# Patient Record
Sex: FEMALE | Race: WHITE | Employment: FULL TIME | URBAN - METROPOLITAN AREA
[De-identification: names, ages, dates, MRNs, and addresses within clinical notes are randomized per-mention and may not be internally consistent; named-entity substitution may affect disease eponyms.]

---

## 2020-06-29 ENCOUNTER — HOSPITAL ENCOUNTER (EMERGENCY)
Age: 35
Discharge: HOME | End: 2020-06-30
Attending: EMERGENCY MEDICINE
Payer: SELF-PAY

## 2020-06-29 ENCOUNTER — APPOINTMENT (OUTPATIENT)
Dept: CT IMAGING | Age: 35
End: 2020-06-29
Attending: EMERGENCY MEDICINE
Payer: SELF-PAY

## 2020-06-29 DIAGNOSIS — K59.00 CONSTIPATION, UNSPECIFIED CONSTIPATION TYPE: Primary | ICD-10-CM

## 2020-06-29 LAB
ALBUMIN SERPL-MCNC: 3.7 G/DL (ref 3.5–5)
ALBUMIN/GLOB SERPL: 0.8 {RATIO} (ref 1.2–3.5)
ALP SERPL-CCNC: 108 U/L (ref 50–136)
ALT SERPL-CCNC: 17 U/L (ref 12–65)
ANION GAP SERPL CALC-SCNC: 7 MMOL/L (ref 7–16)
AST SERPL-CCNC: 15 U/L (ref 15–37)
BASOPHILS # BLD: 0.1 K/UL (ref 0–0.2)
BASOPHILS NFR BLD: 1 % (ref 0–2)
BILIRUB SERPL-MCNC: 0.5 MG/DL (ref 0.2–1.1)
BUN SERPL-MCNC: 12 MG/DL (ref 6–23)
CALCIUM SERPL-MCNC: 8.8 MG/DL (ref 8.3–10.4)
CHLORIDE SERPL-SCNC: 103 MMOL/L (ref 98–107)
CO2 SERPL-SCNC: 24 MMOL/L (ref 21–32)
CREAT SERPL-MCNC: 1.17 MG/DL (ref 0.6–1)
DIFFERENTIAL METHOD BLD: ABNORMAL
EOSINOPHIL # BLD: 0.2 K/UL (ref 0–0.8)
EOSINOPHIL NFR BLD: 1 % (ref 0.5–7.8)
ERYTHROCYTE [DISTWIDTH] IN BLOOD BY AUTOMATED COUNT: 12.2 % (ref 11.9–14.6)
GLOBULIN SER CALC-MCNC: 4.7 G/DL (ref 2.3–3.5)
GLUCOSE SERPL-MCNC: 372 MG/DL (ref 65–100)
HCG UR QL: NEGATIVE
HCT VFR BLD AUTO: 48.1 % (ref 35.8–46.3)
HGB BLD-MCNC: 15.8 G/DL (ref 11.7–15.4)
IMM GRANULOCYTES # BLD AUTO: 0 K/UL (ref 0–0.5)
IMM GRANULOCYTES NFR BLD AUTO: 0 % (ref 0–5)
LIPASE SERPL-CCNC: 422 U/L (ref 73–393)
LYMPHOCYTES # BLD: 2.9 K/UL (ref 0.5–4.6)
LYMPHOCYTES NFR BLD: 23 % (ref 13–44)
MCH RBC QN AUTO: 30.7 PG (ref 26.1–32.9)
MCHC RBC AUTO-ENTMCNC: 32.8 G/DL (ref 31.4–35)
MCV RBC AUTO: 93.6 FL (ref 79.6–97.8)
MONOCYTES # BLD: 1 K/UL (ref 0.1–1.3)
MONOCYTES NFR BLD: 8 % (ref 4–12)
NEUTS SEG # BLD: 8.3 K/UL (ref 1.7–8.2)
NEUTS SEG NFR BLD: 67 % (ref 43–78)
NRBC # BLD: 0 K/UL (ref 0–0.2)
PLATELET # BLD AUTO: 337 K/UL (ref 150–450)
PMV BLD AUTO: 11.4 FL (ref 9.4–12.3)
POTASSIUM SERPL-SCNC: 3.6 MMOL/L (ref 3.5–5.1)
PROT SERPL-MCNC: 8.4 G/DL (ref 6.3–8.2)
RBC # BLD AUTO: 5.14 M/UL (ref 4.05–5.2)
SODIUM SERPL-SCNC: 134 MMOL/L (ref 136–145)
WBC # BLD AUTO: 12.4 K/UL (ref 4.3–11.1)

## 2020-06-29 PROCEDURE — 74011000258 HC RX REV CODE- 258: Performed by: EMERGENCY MEDICINE

## 2020-06-29 PROCEDURE — 81003 URINALYSIS AUTO W/O SCOPE: CPT

## 2020-06-29 PROCEDURE — 74011250637 HC RX REV CODE- 250/637: Performed by: EMERGENCY MEDICINE

## 2020-06-29 PROCEDURE — 80053 COMPREHEN METABOLIC PANEL: CPT

## 2020-06-29 PROCEDURE — 99285 EMERGENCY DEPT VISIT HI MDM: CPT

## 2020-06-29 PROCEDURE — 74011250636 HC RX REV CODE- 250/636: Performed by: EMERGENCY MEDICINE

## 2020-06-29 PROCEDURE — 83690 ASSAY OF LIPASE: CPT

## 2020-06-29 PROCEDURE — 74177 CT ABD & PELVIS W/CONTRAST: CPT

## 2020-06-29 PROCEDURE — 81025 URINE PREGNANCY TEST: CPT

## 2020-06-29 PROCEDURE — 85025 COMPLETE CBC W/AUTO DIFF WBC: CPT

## 2020-06-29 PROCEDURE — 74011636320 HC RX REV CODE- 636/320: Performed by: EMERGENCY MEDICINE

## 2020-06-29 RX ORDER — MAGNESIUM CITRATE
296 SOLUTION, ORAL ORAL
Status: COMPLETED | OUTPATIENT
Start: 2020-06-29 | End: 2020-06-30

## 2020-06-29 RX ORDER — SODIUM CHLORIDE 0.9 % (FLUSH) 0.9 %
10 SYRINGE (ML) INJECTION
Status: COMPLETED | OUTPATIENT
Start: 2020-06-29 | End: 2020-06-29

## 2020-06-29 RX ORDER — PROMETHAZINE HYDROCHLORIDE 25 MG/1
25 TABLET ORAL
Status: COMPLETED | OUTPATIENT
Start: 2020-06-29 | End: 2020-06-29

## 2020-06-29 RX ADMIN — PROMETHAZINE HYDROCHLORIDE 25 MG: 25 TABLET ORAL at 22:13

## 2020-06-29 RX ADMIN — Medication 10 ML: at 23:04

## 2020-06-29 RX ADMIN — SODIUM CHLORIDE 1000 ML: 9 INJECTION, SOLUTION INTRAVENOUS at 22:14

## 2020-06-29 RX ADMIN — IOPAMIDOL 100 ML: 755 INJECTION, SOLUTION INTRAVENOUS at 23:04

## 2020-06-29 RX ADMIN — SODIUM CHLORIDE 100 ML: 900 INJECTION, SOLUTION INTRAVENOUS at 23:04

## 2020-06-29 NOTE — ED TRIAGE NOTES
Patient ambulatory to triage without difficulty with mask in place. Patient states she has not been having \"good bowel movements\" for 3 months. States last BM was 1 month ago. States she has attempted laxatives and enemas at home without relief. Reports RUQ pain.

## 2020-06-30 VITALS
HEART RATE: 87 BPM | SYSTOLIC BLOOD PRESSURE: 186 MMHG | HEIGHT: 64 IN | OXYGEN SATURATION: 100 % | TEMPERATURE: 97.9 F | BODY MASS INDEX: 24.75 KG/M2 | RESPIRATION RATE: 17 BRPM | DIASTOLIC BLOOD PRESSURE: 106 MMHG | WEIGHT: 145 LBS

## 2020-06-30 PROCEDURE — 74011250637 HC RX REV CODE- 250/637: Performed by: EMERGENCY MEDICINE

## 2020-06-30 RX ADMIN — MAGNESIUM CITRATE 296 ML: 1.75 LIQUID ORAL at 00:20

## 2020-06-30 NOTE — DISCHARGE INSTRUCTIONS
Patient Education        Constipation: Care Instructions  Your Care Instructions     Constipation means that you have a hard time passing stools (bowel movements). People pass stools from 3 times a day to once every 3 days. What is normal for you may be different. Constipation may occur with pain in the rectum and cramping. The pain may get worse when you try to pass stools. Sometimes there are small amounts of bright red blood on toilet paper or the surface of stools. This is because of enlarged veins near the rectum (hemorrhoids). A few changes in your diet and lifestyle may help you avoid ongoing constipation. Your doctor may also prescribe medicine to help loosen your stool. Some medicines can cause constipation. These include pain medicines and antidepressants. Tell your doctor about all the medicines you take. Your doctor may want to make a medicine change to ease your symptoms. Follow-up care is a key part of your treatment and safety. Be sure to make and go to all appointments, and call your doctor if you are having problems. It's also a good idea to know your test results and keep a list of the medicines you take. How can you care for yourself at home? · Drink plenty of fluids, enough so that your urine is light yellow or clear like water. If you have kidney, heart, or liver disease and have to limit fluids, talk with your doctor before you increase the amount of fluids you drink. · Include high-fiber foods in your diet each day. These include fruits, vegetables, beans, and whole grains. · Get at least 30 minutes of exercise on most days of the week. Walking is a good choice. You also may want to do other activities, such as running, swimming, cycling, or playing tennis or team sports. · Take a fiber supplement, such as Citrucel or Metamucil, every day. Read and follow all instructions on the label. · Schedule time each day for a bowel movement. A daily routine may help.  Take your time having your bowel movement. · Support your feet with a small step stool when you sit on the toilet. This helps flex your hips and places your pelvis in a squatting position. · Your doctor may recommend an over-the-counter laxative to relieve your constipation. Examples are Milk of Magnesia and MiraLax. Read and follow all instructions on the label. Do not use laxatives on a long-term basis. When should you call for help? Call your doctor now or seek immediate medical care if:  · You have new or worse belly pain. · You have new or worse nausea or vomiting. · You have blood in your stools. Watch closely for changes in your health, and be sure to contact your doctor if:  · Your constipation is getting worse. · You do not get better as expected. Where can you learn more? Go to http://levon-citlalli.info/  Enter P343 in the search box to learn more about \"Constipation: Care Instructions. \"  Current as of: June 26, 2019               Content Version: 12.5  © 2006-8604 [employer]. Care instructions adapted under license by [employer] (which disclaims liability or warranty for this information). If you have questions about a medical condition or this instruction, always ask your healthcare professional. [address] any warranty or liability for your use of this information.

## 2020-06-30 NOTE — ED PROVIDER NOTES
Pt with DM and fibromyalgia. Has had trouble with constipation for past 3 months getting worse this month. For the past week has had diffuse abdominal pain and nausea and vomiting. Tried home enema with minimal relief. No dysuria hematuria. No vaginal bleeding or discharge. The history is provided by the patient. No  was used. Constipation    This is a new problem. Episode onset: 3 months. The stool is described as hard. Associated symptoms include abdominal pain, nausea, vomiting and constipation. Pertinent negatives include no dysuria, no abdominal distention, no chills, no fever, no back pain and no diarrhea. She has tried nothing for the symptoms. Past Medical History:   Diagnosis Date    Diabetes ([facility] [address]     IDDM    Neurological disorder     back pain       Past Surgical History:   Procedure Laterality Date    HX CHOLECYSTECTOMY      HX ORTHOPAEDIC      bilateral feet to remove tumors         No family history on file.     Social History     Socioeconomic History    Marital status: SINGLE     Spouse name: Not on file    Number of children: Not on file    Years of education: Not on file    Highest education level: Not on file   Occupational History    Not on file   Social Needs    Financial resource strain: Not on file    Food insecurity     Worry: Not on file     Inability: Not on file    Transportation needs     Medical: Not on file     Non-medical: Not on file   Tobacco Use    Smoking status: Current Every Day Smoker     Packs/day: 0.50    Smokeless tobacco: Never Used   Substance and Sexual Activity    Alcohol use: Yes     Comment: socially    Drug use: No    Sexual activity: Yes     Partners: Male     Birth control/protection: None   Lifestyle    Physical activity     Days per week: Not on file     Minutes per session: Not on file    Stress: Not on file   Relationships    Social connections     Talks on phone: Not on file     Gets together: Not on file Attends Yazdanism service: Not on file     Active member of club or organization: Not on file     Attends meetings of clubs or organizations: Not on file     Relationship status: Not on file    Intimate partner violence     Fear of current or ex partner: Not on file     Emotionally abused: Not on file     Physically abused: Not on file     Forced sexual activity: Not on file   Other Topics Concern    Not on file   Social History Narrative    Not on file         ALLERGIES: Tramadol    Review of Systems   Constitutional: Negative for chills and fever. HENT: Negative for rhinorrhea and sore throat. Eyes: Negative for pain and redness. Respiratory: Negative for chest tightness, shortness of breath and wheezing. Cardiovascular: Negative for chest pain and leg swelling. Gastrointestinal: Positive for abdominal pain, constipation, nausea and vomiting. Negative for abdominal distention and diarrhea. Genitourinary: Negative for dysuria and hematuria. Musculoskeletal: Negative for back pain, gait problem, neck pain and neck stiffness. Skin: Negative for color change and rash. Neurological: Negative for weakness, numbness and headaches. Vitals:    06/29/20 1849   BP: (!) 155/101   Pulse: (!) 107   Resp: 16   Temp: 97.9 °F (36.6 °C)   SpO2: 99%   Weight: 65.8 kg (145 lb)   Height: 5' 4\" (1.626 m)            Physical Exam  Constitutional:       Appearance: Normal appearance. She is well-developed. HENT:      Head: Normocephalic and atraumatic. Neck:      Musculoskeletal: Normal range of motion and neck supple. Cardiovascular:      Rate and Rhythm: Normal rate and regular rhythm. Pulmonary:      Effort: Pulmonary effort is normal.      Breath sounds: Normal breath sounds. Abdominal:      General: Bowel sounds are normal.      Palpations: Abdomen is soft. Tenderness: There is abdominal tenderness (mild diffuse). Genitourinary:     Comments: No fecal impaction. Anterior rectal mass. Musculoskeletal: Normal range of motion. General: No swelling. Skin:     General: Skin is warm and dry. Neurological:      Mental Status: She is alert and oriented to person, place, and time. MDM  Number of Diagnoses or Management Options  Diagnosis management comments: Patient with constipation. Some relief with enema and mag citrate. Will discharge. Amount and/or Complexity of Data Reviewed  Clinical lab tests: ordered and reviewed  Tests in the medicine section of CPT®: ordered and reviewed    Patient Progress  Patient progress: stable         Procedures        Results Include:    Recent Results (from the past 24 hour(s))   CBC WITH AUTOMATED DIFF    Collection Time: 06/29/20  7:00 PM   Result Value Ref Range    WBC 12.4 (H) 4.3 - 11.1 K/uL    RBC 5.14 4.05 - 5.2 M/uL    HGB 15.8 (H) 11.7 - 15.4 g/dL    HCT 48.1 (H) 35.8 - 46.3 %    MCV 93.6 79.6 - 97.8 FL    MCH 30.7 26.1 - 32.9 PG    MCHC 32.8 31.4 - 35.0 g/dL    RDW 12.2 11.9 - 14.6 %    PLATELET 782 950 - 076 K/uL    MPV 11.4 9.4 - 12.3 FL    ABSOLUTE NRBC 0.00 0.0 - 0.2 K/uL    DF AUTOMATED      NEUTROPHILS 67 43 - 78 %    LYMPHOCYTES 23 13 - 44 %    MONOCYTES 8 4.0 - 12.0 %    EOSINOPHILS 1 0.5 - 7.8 %    BASOPHILS 1 0.0 - 2.0 %    IMMATURE GRANULOCYTES 0 0.0 - 5.0 %    ABS. NEUTROPHILS 8.3 (H) 1.7 - 8.2 K/UL    ABS. LYMPHOCYTES 2.9 0.5 - 4.6 K/UL    ABS. MONOCYTES 1.0 0.1 - 1.3 K/UL    ABS. EOSINOPHILS 0.2 0.0 - 0.8 K/UL    ABS. BASOPHILS 0.1 0.0 - 0.2 K/UL    ABS. IMM.  GRANS. 0.0 0.0 - 0.5 K/UL   HCG URINE, QL. - POC    Collection Time: 06/29/20  9:25 PM   Result Value Ref Range    Pregnancy test,urine (POC) Negative NEG     METABOLIC PANEL, COMPREHENSIVE    Collection Time: 06/29/20  9:38 PM   Result Value Ref Range    Sodium 134 (L) 136 - 145 mmol/L    Potassium 3.6 3.5 - 5.1 mmol/L    Chloride 103 98 - 107 mmol/L    CO2 24 21 - 32 mmol/L    Anion gap 7 7 - 16 mmol/L    Glucose 372 (H) 65 - 100 mg/dL    BUN 12 6 - 23 MG/DL    Creatinine 1.17 (H) 0.6 - 1.0 MG/DL    GFR est AA >60 >60 ml/min/1.73m2    GFR est non-AA 56 (L) >60 ml/min/1.73m2    Calcium 8.8 8.3 - 10.4 MG/DL    Bilirubin, total 0.5 0.2 - 1.1 MG/DL    ALT (SGPT) 17 12 - 65 U/L    AST (SGOT) 15 15 - 37 U/L    Alk. phosphatase 108 50 - 136 U/L    Protein, total 8.4 (H) 6.3 - 8.2 g/dL    Albumin 3.7 3.5 - 5.0 g/dL    Globulin 4.7 (H) 2.3 - 3.5 g/dL    A-G Ratio 0.8 (L) 1.2 - 3.5     LIPASE    Collection Time: 06/29/20  9:38 PM   Result Value Ref Range    Lipase 422 (H) 73 - 393 U/L       CT ABD PELV W CONT (Final result)   Result time 06/29/20 23:45:27   Final result by Ngoc Puentes MD (06/29/20 23:45:27)                Impression:    IMPRESSION:    1. A 3 cm cystic lesion in the right adnexa, likely hemorrhagic cyst.    2. Moderate stool volume in the colon. Negative for colitis, diverticulitis,  appendicitis or bowel obstruction. No hydronephrosis. 3. Cholecystectomy. Narrative:    CT abdomen and pelvis with contrast     COMPARISON: None. INDICATION: Abdominal pain. TECHNIQUE: CT imaging was performed of the abdomen and pelvis following the  uncomplicated administration of intravenous contrast (Isovue 370, 100 mL).  Oral  contrast was administered. Radiation dose reduction techniques were used for  this study:  Our CT scanners use one or all of the following: Automated exposure  control, adjustment of the mA and/or kVp according to patient's size, iterative  reconstruction. FINDINGS:    Visualized lung bases are unremarkable. Abdomen findings:    Gallbladder is surgically absent. The liver, pancreas, spleen, adrenal glands,  and the kidneys are unremarkable. Abdominal aorta is normal in course and  caliber. The stomach is normal in contour. Small bowel loops are normal in caliber. There  is no small bowel obstruction. No evidence of lymphadenopathy. Pelvic findings: There is a 3 cm cystic lesion in the right adnexa.  Uterus is not well evaluated. Urinary bladder is normal in contour. The colon is normal in course and caliber  with moderate stool volume. Appendix is within normal limits. There is no free air or free fluid. Surrounding bones are intact.

## 2020-06-30 NOTE — ED NOTES
I have reviewed discharge instructions with the patient. The patient verbalized understanding. Patient left ED via Discharge Method: ambulatory to Home with self. Opportunity for questions and clarification provided. Patient given 0 scripts. To continue your aftercare when you leave the hospital, you may receive an automated call from our care team to check in on how you are doing. This is a free service and part of our promise to provide the best care and service to meet your aftercare needs.  If you have questions, or wish to unsubscribe from this service please call [phone]. Thank you for Choosing our [facility] Emergency Department.

## 2020-12-04 ENCOUNTER — HOSPITAL ENCOUNTER (EMERGENCY)
Age: 35
Discharge: HOME | End: 2020-12-05
Attending: EMERGENCY MEDICINE

## 2020-12-04 DIAGNOSIS — H54.62 VISION LOSS, LEFT EYE: Primary | ICD-10-CM

## 2020-12-04 DIAGNOSIS — H57.04 MYDRIASIS: ICD-10-CM

## 2020-12-04 LAB
ALBUMIN SERPL-MCNC: 3.7 G/DL (ref 3.5–5)
ALBUMIN/GLOB SERPL: 0.8 {RATIO} (ref 1.2–3.5)
ALP SERPL-CCNC: 100 U/L (ref 50–136)
ALT SERPL-CCNC: 12 U/L (ref 12–65)
ANION GAP SERPL CALC-SCNC: 8 MMOL/L (ref 7–16)
AST SERPL-CCNC: 14 U/L (ref 15–37)
BASOPHILS # BLD: 0.1 K/UL (ref 0–0.2)
BASOPHILS NFR BLD: 0 % (ref 0–2)
BILIRUB SERPL-MCNC: 0.2 MG/DL (ref 0.2–1.1)
BUN SERPL-MCNC: 15 MG/DL (ref 6–23)
CALCIUM SERPL-MCNC: 9.6 MG/DL (ref 8.3–10.4)
CHLORIDE SERPL-SCNC: 105 MMOL/L (ref 98–107)
CO2 SERPL-SCNC: 23 MMOL/L (ref 21–32)
CREAT SERPL-MCNC: 1.23 MG/DL (ref 0.6–1)
DIFFERENTIAL METHOD BLD: ABNORMAL
EOSINOPHIL # BLD: 0.2 K/UL (ref 0–0.8)
EOSINOPHIL NFR BLD: 2 % (ref 0.5–7.8)
ERYTHROCYTE [DISTWIDTH] IN BLOOD BY AUTOMATED COUNT: 12 % (ref 11.9–14.6)
GLOBULIN SER CALC-MCNC: 4.4 G/DL (ref 2.3–3.5)
GLUCOSE BLD STRIP.AUTO-MCNC: 275 MG/DL (ref 65–100)
GLUCOSE SERPL-MCNC: 269 MG/DL (ref 65–100)
HCT VFR BLD AUTO: 45.3 % (ref 35.8–46.3)
HGB BLD-MCNC: 15.2 G/DL (ref 11.7–15.4)
IMM GRANULOCYTES # BLD AUTO: 0.1 K/UL (ref 0–0.5)
IMM GRANULOCYTES NFR BLD AUTO: 1 % (ref 0–5)
LYMPHOCYTES # BLD: 3.7 K/UL (ref 0.5–4.6)
LYMPHOCYTES NFR BLD: 29 % (ref 13–44)
MCH RBC QN AUTO: 31.1 PG (ref 26.1–32.9)
MCHC RBC AUTO-ENTMCNC: 33.6 G/DL (ref 31.4–35)
MCV RBC AUTO: 92.6 FL (ref 79.6–97.8)
MONOCYTES # BLD: 0.6 K/UL (ref 0.1–1.3)
MONOCYTES NFR BLD: 5 % (ref 4–12)
NEUTS SEG # BLD: 8.2 K/UL (ref 1.7–8.2)
NEUTS SEG NFR BLD: 64 % (ref 43–78)
NRBC # BLD: 0 K/UL (ref 0–0.2)
PLATELET # BLD AUTO: 322 K/UL (ref 150–450)
PMV BLD AUTO: 10.9 FL (ref 9.4–12.3)
POTASSIUM SERPL-SCNC: 3.9 MMOL/L (ref 3.5–5.1)
PROT SERPL-MCNC: 8.1 G/DL (ref 6.3–8.2)
RBC # BLD AUTO: 4.89 M/UL (ref 4.05–5.2)
SODIUM SERPL-SCNC: 136 MMOL/L (ref 136–145)
WBC # BLD AUTO: 12.8 K/UL (ref 4.3–11.1)

## 2020-12-04 PROCEDURE — 82962 GLUCOSE BLOOD TEST: CPT

## 2020-12-04 PROCEDURE — 80053 COMPREHEN METABOLIC PANEL: CPT

## 2020-12-04 PROCEDURE — 99285 EMERGENCY DEPT VISIT HI MDM: CPT

## 2020-12-04 PROCEDURE — 85025 COMPLETE CBC W/AUTO DIFF WBC: CPT

## 2020-12-04 PROCEDURE — 74011000250 HC RX REV CODE- 250: Performed by: EMERGENCY MEDICINE

## 2020-12-04 PROCEDURE — 83036 HEMOGLOBIN GLYCOSYLATED A1C: CPT

## 2020-12-04 RX ORDER — TETRACAINE HYDROCHLORIDE 5 MG/ML
1 SOLUTION OPHTHALMIC
Status: COMPLETED | OUTPATIENT
Start: 2020-12-04 | End: 2020-12-04

## 2020-12-04 RX ADMIN — TETRACAINE HYDROCHLORIDE 1 DROP: 5 SOLUTION OPHTHALMIC at 23:39

## 2020-12-04 RX ADMIN — FLUORESCEIN SODIUM 1 STRIP: 1 STRIP OPHTHALMIC at 23:39

## 2020-12-05 ENCOUNTER — APPOINTMENT (OUTPATIENT)
Dept: CT IMAGING | Age: 35
End: 2020-12-05
Attending: EMERGENCY MEDICINE

## 2020-12-05 VITALS
WEIGHT: 145 LBS | OXYGEN SATURATION: 98 % | HEART RATE: 105 BPM | BODY MASS INDEX: 24.75 KG/M2 | TEMPERATURE: 98.3 F | SYSTOLIC BLOOD PRESSURE: 155 MMHG | HEIGHT: 64 IN | RESPIRATION RATE: 16 BRPM | DIASTOLIC BLOOD PRESSURE: 90 MMHG

## 2020-12-05 LAB
EST. AVERAGE GLUCOSE BLD GHB EST-MCNC: 255 MG/DL
HBA1C MFR BLD: 10.5 % (ref 4.8–6)

## 2020-12-05 PROCEDURE — 70450 CT HEAD/BRAIN W/O DYE: CPT

## 2020-12-05 NOTE — ED NOTES
I have reviewed discharge instructions with the patient. The patient verbalized understanding. Patient left ED via Discharge Method: ambulatory to Home with self. Opportunity for questions and clarification provided. Patient given 0 scripts. To continue your aftercare when you leave the hospital, you may receive an automated call from our care team to check in on how you are doing.  This is a free service and part of our promise to provide the best care and service to meet your aftercare needs. \" If you have questions, or wish to unsubscribe from this service please call [phone].  Thank you for Choosing our [facility] Emergency Department.

## 2020-12-05 NOTE — ED TRIAGE NOTES
Pt arrives POV c/o seeing floaters in L eye for the last three days with visual loss. L eye painful to touch. Pt c/o headache and nausea. L pupil irregular, fixed, 6mm. R pupil is round, brisk, and 3mm.  with EMS. Pt has not taken insulin.  at time of triage. Pt reports swelling in hands and feet. Pt reports swelling is worse in morning than at night.

## 2020-12-05 NOTE — ED PROVIDER NOTES
Patient is a 81-year-old female presenting to the emergency department today complaining of vision loss in the left upper outer field of the left eye for the last 3 days. The patient said that she woke up on Tuesday and walked outside she thought maybe it was just the bright sun but then her vision never returned. She did not initially noticed that her left pupil was dilated that morning but later that afternoon she noticed it was much bigger than the right. The patient says that her vision has stayed gone since Tuesday morning. She says she is concerned about coming to the emergency department. She denies coming into contact with any over-the-counter or prescription medications. She is a diabetic and her hemoglobin A1c last time it was checked was 13. She said that she takes her insulin occasionally. 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 She smokes about a pack cigarettes per day. Past Medical History:   Diagnosis Date    Diabetes ([address]     IDDM    Neurological disorder     back pain       Past Surgical History:   Procedure Laterality Date    HX CHOLECYSTECTOMY      HX ORTHOPAEDIC      bilateral feet to remove tumors         No family history on file.     Social History     Socioeconomic History    Marital status: SINGLE     Spouse name: Not on file    Number of children: Not on file    Years of education: Not on file    Highest education level: Not on file   Occupational History    Not on file   Social Needs    Financial resource strain: Not on file    Food insecurity     Worry: Not on file     Inability: Not on file    Transportation needs     Medical: Not on file     Non-medical: Not on file   Tobacco Use    Smoking status: Current Every Day Smoker     Packs/day: 0.50    Smokeless tobacco: Never Used   Substance and Sexual Activity    Alcohol use: Yes     Comment: socially    Drug use: No    Sexual activity: Yes     Partners: Male     Birth control/protection: None   Lifestyle    Physical activity     Days per week: Not on file     Minutes per session: Not on file    Stress: Not on file   Relationships    Social connections     Talks on phone: Not on file     Gets together: Not on file     Attends Holiness service: Not on file     Active member of club or organization: Not on file     Attends meetings of clubs or organizations: Not on file     Relationship status: Not on file    Intimate partner violence     Fear of current or ex partner: Not on file     Emotionally abused: Not on file     Physically abused: Not on file     Forced sexual activity: Not on file   Other Topics Concern    Not on file   Social History Narrative    Not on file         ALLERGIES: Tramadol    Review of Systems   Eyes: Positive for visual disturbance. Neurological: Positive for headaches. All other systems reviewed and are negative. Vitals:    12/04/20 2227   BP: (!) 189/111   Pulse: 100   Resp: 16   Temp: 98.3 °F (36.8 °C)   SpO2: 99%   Weight: 65.8 kg (145 lb)   Height: 5' 4\" (1.626 m)            Physical Exam     GENERAL:The patient has Body mass index is 24.89 kg/m². Well-hydrated. No acute distress. VITAL SIGNS: Heart rate, blood pressure, respiratory rate reviewed as recorded in  nurse's notes  EYES: Right eye is clear. Examination of the left eye reveals no bulbar conjunctival injection. There is no erythema or edema to the upper or lower eyelid. Extraocular muscles are intact without pain or limitation bilaterally. Visual acuity as noted in nursing notes. Funduscopic examination is positive for red reflex. After administration of fluorescein and tetracaine woods lamp examination was performed of the left eye, reveals no superficial corneal abrasions. There are no dendritic lesions. No globe perforation, negative Elizabeth sign. No obvious ocular trauma or proptosis. Anterior chamber is clear. No flare cells, hyphema or hypopyon.  No perilimbal flush, no signs of iritis. Left pupil is dilated and nonreactive. Positive red reflex present. Left eye pressure is 13 tested twice with the same average. Ultrasound of the left eye shows no retinal detachment appreciated. MOUTH: Uvula is midline. There is no tonsillar enlargement or exudates appreciated. The patient does have cobblestoning noted in the posterior pharynx. The floor the  mouth is soft and there is no lesions or lacerations normal cavity. NECK: Positive tenderness to palpation with enlargement of the submandibular lymph  nodes bilaterally. Trachea midline. LUNGS: No accessory muscle use  CARDIOVASCULAR: Regular rate and rhythm  EXTREMITIES: Pt moving all 4 extremities with out limitations. Normal muscle tone. NEUROLOGIC: Cranial nerve exam reveals face is symmetrical, tongue is midline  speech is clear. No focal deficits noted  SKIN: No rash or petechiae. Good skin turgor palpated. PSYCHIATRIC: Alert and oriented. Appropriate behavior and judgment. MDM  Number of Diagnoses or Management Options  Diagnosis management comments: Conjunctivitis, orbital cellulitis, periorbital cellulitis, globe rupture, corneal abrasion, iritis, uveitis, acute closed angle glaucoma, macular degeneration, retinal detachment,         Amount and/or Complexity of Data Reviewed  Clinical lab tests: reviewed and ordered  Tests in the radiology section of CPT®: reviewed and ordered  Tests in the medicine section of CPT®: ordered and reviewed  Review and summarize past medical records: yes  Independent visualization of images, tracings, or specimens: yes      ED Course as of Dec 05 0147   Sat Dec 05, 2020   0121 IMPRESSION: Small hypodensities in the periventricular white matter of both  frontal lobes. These are nonspecific and could relate to chronic small vessel  ischemia. Other entities such as multiple sclerosis and vasculitis could cause a  similar appearance.    CT HEAD WO CONT [KH]   0141 I spoke with Dr. Rommel Campbell ophthalmology on call.   He requested the patient come to his [place] office at 9:15 am at [facility]-    [KH]      ED Course User Index  [KH] Gladys Blackmon DO       Procedures

## 2024-11-04 ENCOUNTER — HOSPITAL ENCOUNTER (EMERGENCY)
Age: 39
Discharge: HOME | End: 2024-11-04
Payer: COMMERCIAL

## 2024-11-04 VITALS
HEART RATE: 75 BPM | DIASTOLIC BLOOD PRESSURE: 89 MMHG | OXYGEN SATURATION: 100 % | BODY MASS INDEX: 31.28 KG/M2 | TEMPERATURE: 97.9 F | HEIGHT: 62 IN | WEIGHT: 170 LBS | SYSTOLIC BLOOD PRESSURE: 158 MMHG | RESPIRATION RATE: 18 BRPM

## 2024-11-04 DIAGNOSIS — L02.91 ABSCESS: Primary | ICD-10-CM

## 2024-11-04 PROCEDURE — 6370000000 HC RX 637 (ALT 250 FOR IP): Performed by: PHYSICIAN ASSISTANT

## 2024-11-04 PROCEDURE — 99283 EMERGENCY DEPT VISIT LOW MDM: CPT

## 2024-11-04 RX ORDER — CLINDAMYCIN HYDROCHLORIDE 150 MG/1
450 CAPSULE ORAL 3 TIMES DAILY
Qty: 63 CAPSULE | Refills: 0 | Status: SHIPPED | OUTPATIENT
Start: 2024-11-04 | End: 2024-11-14

## 2024-11-04 RX ORDER — IBUPROFEN 600 MG/1
600 TABLET, FILM COATED ORAL
Status: COMPLETED | OUTPATIENT
Start: 2024-11-04 | End: 2024-11-04

## 2024-11-04 RX ORDER — ACETAMINOPHEN 160 MG/5ML
650 SUSPENSION ORAL
Status: DISCONTINUED | OUTPATIENT
Start: 2024-11-04 | End: 2024-11-04

## 2024-11-04 RX ORDER — ACETAMINOPHEN 325 MG/1
650 TABLET ORAL
Status: COMPLETED | OUTPATIENT
Start: 2024-11-04 | End: 2024-11-04

## 2024-11-04 RX ADMIN — ACETAMINOPHEN 650 MG: 325 TABLET ORAL at 17:00

## 2024-11-04 RX ADMIN — IBUPROFEN 600 MG: 600 TABLET, FILM COATED ORAL at 17:00

## 2024-11-04 ASSESSMENT — PAIN SCALES - GENERAL
PAINLEVEL_OUTOF10: 7
PAINLEVEL_OUTOF10: 2

## 2024-11-04 ASSESSMENT — LIFESTYLE VARIABLES
HOW MANY STANDARD DRINKS CONTAINING ALCOHOL DO YOU HAVE ON A TYPICAL DAY: PATIENT DOES NOT DRINK
HOW OFTEN DO YOU HAVE A DRINK CONTAINING ALCOHOL: NEVER

## 2024-11-04 ASSESSMENT — PAIN - FUNCTIONAL ASSESSMENT: PAIN_FUNCTIONAL_ASSESSMENT: 0-10

## 2024-11-04 ASSESSMENT — PAIN DESCRIPTION - LOCATION: LOCATION: VAGINA

## 2024-11-04 NOTE — ED NOTES
Patient mobility status  with no difficulty. Provider aware     I have reviewed discharge instructions with the patient.  The patient verbalized understanding.    Patient left ED via Discharge Method: ambulatory to Home with Friend.    Opportunity for questions and clarification provided.     Patient given 1 scripts.           Syeda Parada RN  11/04/24 0728

## 2024-11-04 NOTE — DISCHARGE INSTRUCTIONS
Take medication as prescribed.    Warm and then cold compresses on area for 15 minutes several times/day. Take Tylenol as needed for pain.    Keep your appointment with your doctor in 2 days and have them recheck the abscess.    Return to ER for worsening symptoms or other concerns.

## 2024-11-04 NOTE — ED PROVIDER NOTES
Alcohol use: Yes    Drug use: No     Social Determinants of Health     Financial Resource Strain: Low Risk  (6/26/2024)    Received from [facility]    Financial Resource Strain     Difficulty Paying Living Expenses: Not very hard     Difficulty Paying Medical Expenses: No   Food Insecurity: No Food Insecurity (9/28/2024)    Received from [facility]    Hunger Vital Sign     Worried About Running Out of Food in the Last Year: Never true     Ran Out of Food in the Last Year: Never true   Transportation Needs: No Transportation Needs (9/28/2024)    Received from [facility]    PRAPARE - Transportation     Lack of Transportation (Medical): No     Lack of Transportation (Non-Medical): No   Physical Activity: Inactive (6/26/2024)    Received from [facility]    Physical Activity     Days of Exercise per Week: 0     Minutes of Exercise per Session: 0     Total Minutes of Exercise per Week: 0   Stress: Stress Concern Present (6/26/2024)    Received from [email or web address]    Stress     Feeling of Stress : To some extent   Social Connections: Socially Integrated (6/26/2024)    Received from [facility]    Social Connections     Frequency of Communication with Friends and Family: More than three times a week     Frequency of Social Gatherings with Friends and Family: More than three times a week   Intimate Partner Violence: Not At Risk (9/28/2024)    Received from [facility]    Abuse Screen     Feels Unsafe at Home or Work/School: no     Feels Threatened by Someone: no     Does Anyone Try to Keep You From Having Contact with Others or Doing Things Outside Your Home?: no     Physical Signs of Abuse Present: no   Housing Stability: High Risk (9/28/2024)    Received from [facility]    Housing Stability Vital Sign     Unable to Pay for Housing in the Last Year: No     Number of Times Moved in the Last Year: 3     Homeless in

## 2025-04-19 ENCOUNTER — HOSPITAL ENCOUNTER (EMERGENCY)
Age: 40
Discharge: HOME | End: 2025-04-22
Attending: EMERGENCY MEDICINE
Payer: COMMERCIAL

## 2025-04-19 DIAGNOSIS — R45.851 SUICIDAL IDEATION: Primary | ICD-10-CM

## 2025-04-19 DIAGNOSIS — Z59.00 HOMELESSNESS: ICD-10-CM

## 2025-04-19 LAB
ALBUMIN SERPL-MCNC: 2.6 G/DL (ref 3.5–5)
ALBUMIN/GLOB SERPL: 0.7 (ref 1–1.9)
ALP SERPL-CCNC: 139 U/L (ref 35–104)
ALT SERPL-CCNC: 13 U/L (ref 8–45)
ANION GAP SERPL CALC-SCNC: 11 MMOL/L (ref 7–16)
AST SERPL-CCNC: 15 U/L (ref 15–37)
BASOPHILS # BLD: 0.04 K/UL (ref 0–0.2)
BASOPHILS NFR BLD: 0.3 % (ref 0–2)
BILIRUB SERPL-MCNC: <0.2 MG/DL (ref 0–1.2)
BUN SERPL-MCNC: 39 MG/DL (ref 6–23)
CALCIUM SERPL-MCNC: 8.7 MG/DL (ref 8.8–10.2)
CHLORIDE SERPL-SCNC: 111 MMOL/L (ref 98–107)
CO2 SERPL-SCNC: 16 MMOL/L (ref 20–29)
CREAT SERPL-MCNC: 3.48 MG/DL (ref 0.6–1.1)
DIFFERENTIAL METHOD BLD: ABNORMAL
EOSINOPHIL # BLD: 0.17 K/UL (ref 0–0.8)
EOSINOPHIL NFR BLD: 1.4 % (ref 0.5–7.8)
ERYTHROCYTE [DISTWIDTH] IN BLOOD BY AUTOMATED COUNT: 14.1 % (ref 11.9–14.6)
ETHANOL SERPL-MCNC: <11 MG/DL (ref 0–0.08)
GLOBULIN SER CALC-MCNC: 3.6 G/DL (ref 2.3–3.5)
GLUCOSE BLD STRIP.AUTO-MCNC: 65 MG/DL (ref 65–100)
GLUCOSE SERPL-MCNC: 69 MG/DL (ref 70–99)
HCT VFR BLD AUTO: 32.9 % (ref 35.8–46.3)
HGB BLD-MCNC: 10.9 G/DL (ref 11.7–15.4)
IMM GRANULOCYTES # BLD AUTO: 0.05 K/UL (ref 0–0.5)
IMM GRANULOCYTES NFR BLD AUTO: 0.4 % (ref 0–5)
LYMPHOCYTES # BLD: 2.49 K/UL (ref 0.5–4.6)
LYMPHOCYTES NFR BLD: 20.8 % (ref 13–44)
MCH RBC QN AUTO: 31 PG (ref 26.1–32.9)
MCHC RBC AUTO-ENTMCNC: 33.1 G/DL (ref 31.4–35)
MCV RBC AUTO: 93.5 FL (ref 82–102)
MONOCYTES # BLD: 1.27 K/UL (ref 0.1–1.3)
MONOCYTES NFR BLD: 10.6 % (ref 4–12)
NEUTS SEG # BLD: 7.95 K/UL (ref 1.7–8.2)
NEUTS SEG NFR BLD: 66.5 % (ref 43–78)
NRBC # BLD: 0 K/UL (ref 0–0.2)
PLATELET # BLD AUTO: 308 K/UL (ref 150–450)
PMV BLD AUTO: 11.4 FL (ref 9.4–12.3)
POTASSIUM SERPL-SCNC: 3.9 MMOL/L (ref 3.5–5.1)
PROT SERPL-MCNC: 6.2 G/DL (ref 6.3–8.2)
RBC # BLD AUTO: 3.52 M/UL (ref 4.05–5.2)
SERVICE CMNT-IMP: NORMAL
SODIUM SERPL-SCNC: 138 MMOL/L (ref 136–145)
WBC # BLD AUTO: 12 K/UL (ref 4.3–11.1)

## 2025-04-19 PROCEDURE — 82962 GLUCOSE BLOOD TEST: CPT

## 2025-04-19 PROCEDURE — 80053 COMPREHEN METABOLIC PANEL: CPT

## 2025-04-19 PROCEDURE — 99285 EMERGENCY DEPT VISIT HI MDM: CPT

## 2025-04-19 PROCEDURE — 85025 COMPLETE CBC W/AUTO DIFF WBC: CPT

## 2025-04-19 PROCEDURE — 82077 ASSAY SPEC XCP UR&BREATH IA: CPT

## 2025-04-19 RX ORDER — ATORVASTATIN CALCIUM 40 MG/1
40 TABLET, FILM COATED ORAL NIGHTLY
Status: DISCONTINUED | OUTPATIENT
Start: 2025-04-19 | End: 2025-04-22 | Stop reason: HOSPADM

## 2025-04-19 RX ORDER — ISOSORBIDE MONONITRATE 30 MG/1
30 TABLET, EXTENDED RELEASE ORAL DAILY
Status: DISCONTINUED | OUTPATIENT
Start: 2025-04-20 | End: 2025-04-22 | Stop reason: HOSPADM

## 2025-04-19 RX ORDER — PANTOPRAZOLE SODIUM 40 MG/1
40 TABLET, DELAYED RELEASE ORAL DAILY
COMMUNITY

## 2025-04-19 RX ORDER — METOPROLOL SUCCINATE 25 MG/1
25 TABLET, EXTENDED RELEASE ORAL DAILY
COMMUNITY

## 2025-04-19 RX ORDER — NITROGLYCERIN 0.4 MG/1
0.4 TABLET SUBLINGUAL EVERY 5 MIN PRN
COMMUNITY

## 2025-04-19 RX ORDER — AMOXICILLIN AND CLAVULANATE POTASSIUM 500; 125 MG/1; MG/1
1 TABLET, FILM COATED ORAL 2 TIMES DAILY
COMMUNITY

## 2025-04-19 RX ORDER — ISOSORBIDE MONONITRATE 30 MG/1
30 TABLET, EXTENDED RELEASE ORAL DAILY
COMMUNITY

## 2025-04-19 RX ORDER — NITROGLYCERIN 0.4 MG/1
0.4 TABLET SUBLINGUAL EVERY 5 MIN PRN
Status: DISCONTINUED | OUTPATIENT
Start: 2025-04-19 | End: 2025-04-22 | Stop reason: HOSPADM

## 2025-04-19 RX ORDER — METOPROLOL SUCCINATE 25 MG/1
25 TABLET, EXTENDED RELEASE ORAL DAILY
Status: DISCONTINUED | OUTPATIENT
Start: 2025-04-20 | End: 2025-04-22 | Stop reason: HOSPADM

## 2025-04-19 RX ORDER — INSULIN GLARGINE 100 [IU]/ML
20 INJECTION, SOLUTION SUBCUTANEOUS NIGHTLY
Status: DISCONTINUED | OUTPATIENT
Start: 2025-04-19 | End: 2025-04-22 | Stop reason: HOSPADM

## 2025-04-19 RX ORDER — ASPIRIN 81 MG/1
81 TABLET, CHEWABLE ORAL DAILY
COMMUNITY

## 2025-04-19 RX ORDER — SERTRALINE HYDROCHLORIDE 100 MG/1
100 TABLET, FILM COATED ORAL DAILY
COMMUNITY

## 2025-04-19 RX ORDER — CLOPIDOGREL BISULFATE 75 MG/1
75 TABLET ORAL DAILY
COMMUNITY

## 2025-04-19 RX ORDER — REPAGLINIDE 1 MG/1
1 TABLET ORAL
Status: DISCONTINUED | OUTPATIENT
Start: 2025-04-20 | End: 2025-04-22 | Stop reason: HOSPADM

## 2025-04-19 RX ORDER — ASPIRIN 81 MG/1
81 TABLET, CHEWABLE ORAL DAILY
Status: DISCONTINUED | OUTPATIENT
Start: 2025-04-20 | End: 2025-04-22 | Stop reason: HOSPADM

## 2025-04-19 RX ORDER — BUTALBITAL, ACETAMINOPHEN AND CAFFEINE 50; 325; 40 MG/1; MG/1; MG/1
2 TABLET ORAL EVERY 8 HOURS PRN
Status: DISCONTINUED | OUTPATIENT
Start: 2025-04-19 | End: 2025-04-22 | Stop reason: HOSPADM

## 2025-04-19 RX ORDER — ATORVASTATIN CALCIUM 40 MG/1
40 TABLET, FILM COATED ORAL DAILY
COMMUNITY

## 2025-04-19 RX ORDER — CLOPIDOGREL BISULFATE 75 MG/1
75 TABLET ORAL DAILY
Status: DISCONTINUED | OUTPATIENT
Start: 2025-04-20 | End: 2025-04-22 | Stop reason: HOSPADM

## 2025-04-19 RX ORDER — DOXYCYCLINE 100 MG/1
100 CAPSULE ORAL 2 TIMES DAILY
COMMUNITY

## 2025-04-19 RX ORDER — REPAGLINIDE 2 MG/1
1 TABLET ORAL
COMMUNITY

## 2025-04-19 RX ORDER — BUTALBITAL, ACETAMINOPHEN AND CAFFEINE 50; 325; 40 MG/1; MG/1; MG/1
2 TABLET ORAL EVERY 8 HOURS PRN
COMMUNITY

## 2025-04-19 SDOH — ECONOMIC STABILITY - HOUSING INSECURITY: HOMELESSNESS UNSPECIFIED: Z59.00

## 2025-04-19 ASSESSMENT — PAIN DESCRIPTION - LOCATION: LOCATION: NOSE

## 2025-04-19 ASSESSMENT — LIFESTYLE VARIABLES
HOW OFTEN DO YOU HAVE A DRINK CONTAINING ALCOHOL: NEVER
HOW MANY STANDARD DRINKS CONTAINING ALCOHOL DO YOU HAVE ON A TYPICAL DAY: PATIENT DOES NOT DRINK

## 2025-04-19 ASSESSMENT — PAIN SCALES - GENERAL: PAINLEVEL_OUTOF10: 10

## 2025-04-19 ASSESSMENT — PAIN - FUNCTIONAL ASSESSMENT: PAIN_FUNCTIONAL_ASSESSMENT: 0-10

## 2025-04-20 PROBLEM — F32.A DEPRESSION: Status: ACTIVE | Noted: 2025-04-20

## 2025-04-20 PROBLEM — L08.9 FACIAL INFECTION: Status: ACTIVE | Noted: 2025-04-18

## 2025-04-20 PROBLEM — Z59.819 HOUSING INSTABILITY: Status: ACTIVE | Noted: 2024-07-03

## 2025-04-20 PROBLEM — N18.4 STAGE 4 CHRONIC KIDNEY DISEASE (HCC): Status: ACTIVE | Noted: 2024-06-22

## 2025-04-20 PROBLEM — I25.112 CORONARY ARTERY DISEASE INVOLVING NATIVE CORONARY ARTERY OF NATIVE HEART WITH REFRACTORY ANGINA PECTORIS: Status: ACTIVE | Noted: 2024-09-14

## 2025-04-20 PROBLEM — L03.90 CELLULITIS: Status: ACTIVE | Noted: 2025-04-18

## 2025-04-20 PROBLEM — R45.851 SUICIDAL IDEATION: Status: ACTIVE | Noted: 2025-04-20

## 2025-04-20 PROBLEM — I10 PRIMARY HYPERTENSION: Status: ACTIVE | Noted: 2022-04-28

## 2025-04-20 LAB
AMPHET UR QL SCN: POSITIVE
BARBITURATES UR QL SCN: POSITIVE
BENZODIAZ UR QL: NEGATIVE
CANNABINOIDS UR QL SCN: NEGATIVE
COCAINE UR QL SCN: NEGATIVE
GLUCOSE BLD STRIP.AUTO-MCNC: 155 MG/DL (ref 65–100)
GLUCOSE BLD STRIP.AUTO-MCNC: 168 MG/DL (ref 65–100)
GLUCOSE BLD STRIP.AUTO-MCNC: 190 MG/DL (ref 65–100)
GLUCOSE BLD STRIP.AUTO-MCNC: 74 MG/DL (ref 65–100)
GLUCOSE BLD STRIP.AUTO-MCNC: 88 MG/DL (ref 65–100)
HCG UR QL: NEGATIVE
METHADONE UR QL: NEGATIVE
OPIATES UR QL: NEGATIVE
PCP UR QL: NEGATIVE
SERVICE CMNT-IMP: ABNORMAL
SERVICE CMNT-IMP: NORMAL
SERVICE CMNT-IMP: NORMAL

## 2025-04-20 PROCEDURE — 6370000000 HC RX 637 (ALT 250 FOR IP): Performed by: INTERNAL MEDICINE

## 2025-04-20 PROCEDURE — 6360000002 HC RX W HCPCS: Performed by: INTERNAL MEDICINE

## 2025-04-20 PROCEDURE — 80307 DRUG TEST PRSMV CHEM ANLYZR: CPT

## 2025-04-20 PROCEDURE — 6370000000 HC RX 637 (ALT 250 FOR IP): Performed by: EMERGENCY MEDICINE

## 2025-04-20 PROCEDURE — 81025 URINE PREGNANCY TEST: CPT

## 2025-04-20 PROCEDURE — 82962 GLUCOSE BLOOD TEST: CPT

## 2025-04-20 RX ORDER — TRAZODONE HYDROCHLORIDE 50 MG/1
50 TABLET ORAL NIGHTLY PRN
Status: DISCONTINUED | OUTPATIENT
Start: 2025-04-20 | End: 2025-04-22 | Stop reason: HOSPADM

## 2025-04-20 RX ORDER — OXYCODONE HYDROCHLORIDE 5 MG/1
5 TABLET ORAL EVERY 4 HOURS PRN
Refills: 0 | Status: DISCONTINUED | OUTPATIENT
Start: 2025-04-20 | End: 2025-04-22 | Stop reason: HOSPADM

## 2025-04-20 RX ORDER — AMOXICILLIN AND CLAVULANATE POTASSIUM 500; 125 MG/1; MG/1
1 TABLET, FILM COATED ORAL EVERY 12 HOURS SCHEDULED
Status: DISCONTINUED | OUTPATIENT
Start: 2025-04-20 | End: 2025-04-22 | Stop reason: HOSPADM

## 2025-04-20 RX ORDER — HYDROXYZINE PAMOATE 25 MG/1
25 CAPSULE ORAL 3 TIMES DAILY PRN
Status: DISCONTINUED | OUTPATIENT
Start: 2025-04-20 | End: 2025-04-20

## 2025-04-20 RX ORDER — HYDROXYZINE PAMOATE 25 MG/1
25 CAPSULE ORAL EVERY 6 HOURS PRN
Status: DISCONTINUED | OUTPATIENT
Start: 2025-04-20 | End: 2025-04-22 | Stop reason: HOSPADM

## 2025-04-20 RX ORDER — OLANZAPINE 5 MG/1
5 TABLET, ORALLY DISINTEGRATING ORAL 2 TIMES DAILY PRN
Status: DISCONTINUED | OUTPATIENT
Start: 2025-04-20 | End: 2025-04-22 | Stop reason: HOSPADM

## 2025-04-20 RX ORDER — DOXYCYCLINE 100 MG/1
100 CAPSULE ORAL EVERY 12 HOURS SCHEDULED
Status: DISCONTINUED | OUTPATIENT
Start: 2025-04-20 | End: 2025-04-22 | Stop reason: HOSPADM

## 2025-04-20 RX ORDER — HEPARIN SODIUM 5000 [USP'U]/ML
5000 INJECTION, SOLUTION INTRAVENOUS; SUBCUTANEOUS EVERY 8 HOURS SCHEDULED
Status: DISCONTINUED | OUTPATIENT
Start: 2025-04-20 | End: 2025-04-22 | Stop reason: HOSPADM

## 2025-04-20 RX ORDER — INSULIN LISPRO 100 [IU]/ML
0-8 INJECTION, SOLUTION INTRAVENOUS; SUBCUTANEOUS
Status: DISCONTINUED | OUTPATIENT
Start: 2025-04-20 | End: 2025-04-22 | Stop reason: HOSPADM

## 2025-04-20 RX ORDER — GABAPENTIN 100 MG/1
200 CAPSULE ORAL 3 TIMES DAILY
Status: DISCONTINUED | OUTPATIENT
Start: 2025-04-20 | End: 2025-04-22 | Stop reason: HOSPADM

## 2025-04-20 RX ADMIN — HEPARIN SODIUM 5000 UNITS: 5000 INJECTION INTRAVENOUS; SUBCUTANEOUS at 09:10

## 2025-04-20 RX ADMIN — ASPIRIN 81 MG: 81 TABLET, CHEWABLE ORAL at 08:13

## 2025-04-20 RX ADMIN — INSULIN LISPRO 2 UNITS: 100 INJECTION, SOLUTION INTRAVENOUS; SUBCUTANEOUS at 13:23

## 2025-04-20 RX ADMIN — HEPARIN SODIUM 5000 UNITS: 5000 INJECTION INTRAVENOUS; SUBCUTANEOUS at 21:56

## 2025-04-20 RX ADMIN — METOPROLOL SUCCINATE 25 MG: 25 TABLET, EXTENDED RELEASE ORAL at 08:13

## 2025-04-20 RX ADMIN — OXYCODONE 5 MG: 5 TABLET ORAL at 13:23

## 2025-04-20 RX ADMIN — INSULIN GLARGINE 20 UNITS: 100 INJECTION, SOLUTION SUBCUTANEOUS at 20:48

## 2025-04-20 RX ADMIN — DOXYCYCLINE HYCLATE 100 MG: 100 CAPSULE ORAL at 09:10

## 2025-04-20 RX ADMIN — OXYCODONE 5 MG: 5 TABLET ORAL at 19:43

## 2025-04-20 RX ADMIN — TRAZODONE HYDROCHLORIDE 50 MG: 50 TABLET ORAL at 20:56

## 2025-04-20 RX ADMIN — OXYCODONE 5 MG: 5 TABLET ORAL at 09:16

## 2025-04-20 RX ADMIN — AMOXICILLIN AND CLAVULANATE POTASSIUM 1 TABLET: 500; 125 TABLET, FILM COATED ORAL at 09:10

## 2025-04-20 RX ADMIN — DOXYCYCLINE HYCLATE 100 MG: 100 CAPSULE ORAL at 20:46

## 2025-04-20 RX ADMIN — GABAPENTIN 200 MG: 100 CAPSULE ORAL at 20:46

## 2025-04-20 RX ADMIN — SERTRALINE HYDROCHLORIDE 150 MG: 50 TABLET ORAL at 08:13

## 2025-04-20 RX ADMIN — AMOXICILLIN AND CLAVULANATE POTASSIUM 1 TABLET: 500; 125 TABLET, FILM COATED ORAL at 20:56

## 2025-04-20 RX ADMIN — ATORVASTATIN CALCIUM 40 MG: 40 TABLET, FILM COATED ORAL at 20:46

## 2025-04-20 RX ADMIN — ISOSORBIDE MONONITRATE 30 MG: 30 TABLET, EXTENDED RELEASE ORAL at 08:13

## 2025-04-20 RX ADMIN — CLOPIDOGREL BISULFATE 75 MG: 75 TABLET, FILM COATED ORAL at 08:13

## 2025-04-20 ASSESSMENT — PAIN DESCRIPTION - LOCATION
LOCATION: HEAD
LOCATION: HEAD

## 2025-04-20 ASSESSMENT — PAIN DESCRIPTION - DESCRIPTORS
DESCRIPTORS: ACHING
DESCRIPTORS: ACHING

## 2025-04-20 ASSESSMENT — PAIN DESCRIPTION - ORIENTATION
ORIENTATION: RIGHT
ORIENTATION: RIGHT

## 2025-04-20 ASSESSMENT — PAIN SCALES - GENERAL
PAINLEVEL_OUTOF10: 10
PAINLEVEL_OUTOF10: 6
PAINLEVEL_OUTOF10: 10
PAINLEVEL_OUTOF10: 10

## 2025-04-20 NOTE — ED NOTES
Patient resting at this time. Respirations are unlabored and even. No signs of distress noted. Safety precautions in place. Sitter at bedside.      Saroj Arguello RN  04/19/25 [id number]

## 2025-04-20 NOTE — ED PROVIDER NOTES
Emergency Department Provider Signout / Continuation of Care Note         DISPOSITION Behavioral Health Hold 04/20/2025 12:46:28 AM       ICD-10-CM    1. Suicidal ideation  R45.851           The patient's care was signed out to me at shift change.      Final Plan      Dr. Casey already filled out the commitment papers which will be certified now that the psychiatry consultation does recommend and agree with inpatient psychiatric admission.  Adjustments to the medications based off psychiatry's recommendations have been made as well.      ED Course as of 04/20/25 0121   Sun Apr 20, 2025   0119 Psych recommends admitting the patient to inpatient psych secondary to continued suicidal ideations.  She also recommends increasing Zoloft 150 mg  Zyprexa 5 mg PRN anxiety  Vistaril 25 mg PRN anxiety. [KH]      ED Course User Index  [KH] aKr Lopez, Kar Henson,   04/20/25 0122

## 2025-04-20 NOTE — ED NOTES
Constant Observer Yes - Name: Danae KENNEDY    Chiki Observer Oriented yes   High risk patients are in line of sight at all times Yes   Excess equipment/medical supplies not necessary for the care of the patient removed Yes   All sharp or dangerous objects are removed from room: including but not limited to belts, pens & pencils, needles, medications, cosmetics, lighters, matches, nail files, watches, necklaces, glass objects, razors, razor blades, knives, aerosol sprays, drawstring pants, shoes, cords (telephone, call bells, etc.) cleaning wipes or other cleaning items, aluminum cans, not permanently attached wall décor Yes   Telephone/cell phone removed as well as TV remote (batteries can be swallowed) Yes   Patient belongings removed and labeled at nurses station Yes   Excess linen is removed from room Yes   All plastic bags are removed from the room and replaced with paper trash bags Yes   Patient is in paper scrubs or appropriate gown and using hospital socks with rubber soles Yes   No metal, hard eating utensils or hard plates are on meal tray Yes   Remove all cleaning agents used by Environmental Services Yes   If Crucifix is hanging on a nail, remove Crucifix as well as the nail Yes       *If any question above is answered \"No,\" documentation is required.       Gurwinder Meadows RN  04/20/25 4446       Gurwinder Meadows RN  04/20/25 7223

## 2025-04-20 NOTE — ED NOTES
[address] has significant outbreaks of pertussis (whooping cough) every year. Therefore, the CDC recommends that all pregnant women receive a Tdap vaccine during pregnancy, regardless of whether you have received one previously. The vaccine reduces your chances of chioma the illness, and also provides some natural immunity to your baby for possible exposures after birth. The best time to get the vaccine is between 27 and 36 weeks. Baby caregivers (grandparents, spouse) should also make sure they are up to date on the vaccine (within the last 10 years). Influenza- Pregnant women who develop the flu are more prone to serious complications that can affect both mother and baby. The CDC recommends that all women who will be pregnant during flu season (October to May) receive the flu vaccine (injection only, not nasal spray). The vaccine can be given during any stage of pregnancy. Both vaccines are offered in our office, as well as through many pharmacies and primary care offices. Covid-19 Vaccine   If you are pregnant or were recently pregnant, you are more likely to get very sick from COVID-19 than people who are not pregnant. Additionally, if you have COVID-19 during pregnancy, you are more likely to have complications that can affect your pregnancy and developing baby. Please check the CDC website for the most up-to-date recommendations on Covid vaccination at www.cdc.gov/coronavirus/vaccine    COVID-19 vaccination is recommended for everyone ages 7 months and older, including people who are pregnant, breastfeeding, trying to get pregnant now, or who might become pregnant in the future. Evidence shows that COVID-19 vaccination before and during pregnancy is safe and effective and suggests that the benefits of vaccination outweigh any known or potential risks.  New data show that vaccination during pregnancy can help protect babies younger than 7 months old, Patient resting at this time. Respirations are unlabored and even. No signs of distress noted. Safety precautions in place. Sitter at bedside.     Yanique Chaparro RN  04/20/25 5432     when they are too young to be vaccinated themselves, from hospitalization due to COVID-19. RSV Vaccine  RSV (Respiratory Syncytial Virus) is a common respiratory virus that causes cold like symptoms in adults and babies. Infants and adults over 61years old are more likely to develop severe RSV infection requiring hospitalization. A new RSV vaccine was released in October 2023 that if given during the third trimester of pregnancy, reduces your baby's risk of being hospitalized with RSV after birth by up to 80%. Therefore the CDC recommends that pregnant moms receive one dose of the RSV vaccine Pfizer Abrysvo sometime between 28 and 36 weeks of pregnancy, before or during RSV season (September through January). If you decide not to get the vaccine, you can talk to your pediatrician about your baby receiving the RSV antibody injection Beyfortus after birth.

## 2025-04-20 NOTE — ED PROVIDER NOTES
Daily psychiatry/behavioral health rounds for Sunday, April 20, patient currently on white papers for suicidal ideation understands the plan to wait for reassessment versus placement.  Complains of pain to her nose thinks something stung her.  States she is already on antibiotics for it.     Michael Ragland MD  04/20/25 8957

## 2025-04-20 NOTE — ED NOTES
Patient resting at this time. Respirations are unlabored and even. No signs of distress noted. Safety precautions in place. Sitter at bedside.     Yanique Chaparro RN  04/20/25 3788

## 2025-04-20 NOTE — ED NOTES
Patient resting at this time. Respirations are unlabored and even. No signs of distress noted. Safety precautions in place. Sitter at bedside.      Saroj Arguello RN  04/20/25 0149

## 2025-04-20 NOTE — ED NOTES
Constant Observer Yes - Name: Brooks Monet Observer Oriented yes   High risk patients are in line of sight at all times Yes   Excess equipment/medical supplies not necessary for the care of the patient removed Yes   All sharp or dangerous objects are removed from room: including but not limited to belts, pens & pencils, needles, medications, cosmetics, lighters, matches, nail files, watches, necklaces, glass objects, razors, razor blades, knives, aerosol sprays, drawstring pants, shoes, cords (telephone, call bells, etc.) cleaning wipes or other cleaning items, aluminum cans, not permanently attached wall décor Yes   Telephone/cell phone removed as well as TV remote (batteries can be swallowed) Yes   Patient belongings removed and labeled at nurses station Yes   Excess linen is removed from room Yes   All plastic bags are removed from the room and replaced with paper trash bags Yes   Patient is in paper scrubs or appropriate gown and using hospital socks with rubber soles Yes   No metal, hard eating utensils or hard plates are on meal tray Yes   Remove all cleaning agents used by Environmental Services Yes   If Crucifix is hanging on a nail, remove Crucifix as well as the nail Yes       *If any question above is answered \"No,\" documentation is required.       Saroj Arguello RN  04/19/25 0868

## 2025-04-20 NOTE — ED NOTES
Patient c/o feeling like her BGL is low at this time. BGL checked and noted to be 65. Dr. Casey notified and patient given sandwich tray and soda.     Saroj Arguello, RN  04/19/25 4685

## 2025-04-20 NOTE — ED NOTES
Patient resting at this time. Respirations are unlabored and even. No signs of distress noted. Safety precautions in place. Sitter at bedside.      Yanique Chaparro RN  04/20/25 3085

## 2025-04-20 NOTE — ED NOTES
Constant Observer Yes - Name: Chandrika Monet Observer Oriented yes   High risk patients are in line of sight at all times Yes   Excess equipment/medical supplies not necessary for the care of the patient removed Yes   All sharp or dangerous objects are removed from room: including but not limited to belts, pens & pencils, needles, medications, cosmetics, lighters, matches, nail files, watches, necklaces, glass objects, razors, razor blades, knives, aerosol sprays, drawstring pants, shoes, cords (telephone, call bells, etc.) cleaning wipes or other cleaning items, aluminum cans, not permanently attached wall décor Yes   Telephone/cell phone removed as well as TV remote (batteries can be swallowed) Yes   Patient belongings removed and labeled at nurses station Yes   Excess linen is removed from room Yes   All plastic bags are removed from the room and replaced with paper trash bags Yes   Patient is in paper scrubs or appropriate gown and using hospital socks with rubber soles Yes   No metal, hard eating utensils or hard plates are on meal tray Yes   Remove all cleaning agents used by [employer] Services Yes   If Crucifix is hanging on a nail, remove Crucifix as well as the nail Yes       *If any question above is answered \"No,\" documentation is required.        Saroj Arguello RN  04/19/25 5245

## 2025-04-20 NOTE — ED NOTES
Patient resting at this time. Respirations are unlabored and even. No signs of distress noted. Safety precautions in place. Sitter at bedside.        Yanique Chaparro RN  04/20/25 0593

## 2025-04-20 NOTE — ED NOTES
Patient resting at this time. Respirations are unlabored and even. No signs of distress noted. Safety precautions in place. Sitter at bedside.      Saroj Arguello RN  04/20/25 0589

## 2025-04-20 NOTE — ED PROVIDER NOTES
Emergency Department Provider Note       PCP: Unknown, Provider   Age: 40 y.o.   Sex: female     DISPOSITION Behavioral Health Hold 04/20/2025 12:46:28 AM    ICD-10-CM    1. Suicidal ideation  R45.851           Medical Decision Making     Patient was seen and examined.  Her home medications were updated.  Her labs reveal an elevated creatinine not dissimilar from what she has had in the past.  Psych consult was placed.  Patient will be checked out to Dr. Renteria for follow-up of the psych eval and psych medications to be placed on.  An inpatient consult to medicine due to her multiple medical problems will be placed.     1 or more acute illnesses that pose a threat to life or bodily function.   Prescription drug management performed.  Discussion with external consultants.  I independently ordered and reviewed each unique test.    I reviewed external records: ED visit note from a different ED.   I reviewed external records: previous lab results from outside ED.               Critical care procedure note : 45 minutes of critical care time was performed in the emergency department. This was separate from any other procedures listed during the patients emergency department course. The failure to initiate these interventions on an urgent basis would likely have resulted in sudden, clinically significant or life-threatening deterioration in the patients condition.       History     Presents with complaint of wanting to harm herself.  Patient states she has been feeling depressed due to her chronic medical issues.  She is also homeless.  She states she has had thoughts of wanting to overdose on her insulin.  She has never been admitted for this.  She does take Zoloft.  She has all of her home medications.  She is a smoker but denies alcohol use she stopped using methamphetamines 1 month ago.  She has been on antibiotics for a sore on her nose.    The history is provided by the patient.     Physical Exam     Vitals signs

## 2025-04-21 LAB
GLUCOSE BLD STRIP.AUTO-MCNC: 110 MG/DL (ref 65–100)
GLUCOSE BLD STRIP.AUTO-MCNC: 112 MG/DL (ref 65–100)
GLUCOSE BLD STRIP.AUTO-MCNC: 193 MG/DL (ref 65–100)
GLUCOSE BLD STRIP.AUTO-MCNC: 199 MG/DL (ref 65–100)
GLUCOSE BLD STRIP.AUTO-MCNC: 99 MG/DL (ref 65–100)
SERVICE CMNT-IMP: ABNORMAL
SERVICE CMNT-IMP: NORMAL

## 2025-04-21 PROCEDURE — 6360000002 HC RX W HCPCS: Performed by: INTERNAL MEDICINE

## 2025-04-21 PROCEDURE — 6370000000 HC RX 637 (ALT 250 FOR IP): Performed by: EMERGENCY MEDICINE

## 2025-04-21 PROCEDURE — 82962 GLUCOSE BLOOD TEST: CPT

## 2025-04-21 PROCEDURE — 6370000000 HC RX 637 (ALT 250 FOR IP): Performed by: INTERNAL MEDICINE

## 2025-04-21 RX ADMIN — DOXYCYCLINE HYCLATE 100 MG: 100 CAPSULE ORAL at 21:08

## 2025-04-21 RX ADMIN — TRAZODONE HYDROCHLORIDE 50 MG: 50 TABLET ORAL at 21:20

## 2025-04-21 RX ADMIN — DOXYCYCLINE HYCLATE 100 MG: 100 CAPSULE ORAL at 08:09

## 2025-04-21 RX ADMIN — METOPROLOL SUCCINATE 25 MG: 25 TABLET, EXTENDED RELEASE ORAL at 08:08

## 2025-04-21 RX ADMIN — REPAGLINIDE 1 MG: 1 TABLET ORAL at 17:40

## 2025-04-21 RX ADMIN — BUTALBITAL, ACETAMINOPHEN, AND CAFFEINE 2 TABLET: 325; 50; 40 TABLET ORAL at 04:39

## 2025-04-21 RX ADMIN — REPAGLINIDE 1 MG: 1 TABLET ORAL at 06:44

## 2025-04-21 RX ADMIN — REPAGLINIDE 1 MG: 1 TABLET ORAL at 10:08

## 2025-04-21 RX ADMIN — HEPARIN SODIUM 5000 UNITS: 5000 INJECTION INTRAVENOUS; SUBCUTANEOUS at 15:47

## 2025-04-21 RX ADMIN — GABAPENTIN 200 MG: 100 CAPSULE ORAL at 21:09

## 2025-04-21 RX ADMIN — OXYCODONE 5 MG: 5 TABLET ORAL at 08:34

## 2025-04-21 RX ADMIN — ATORVASTATIN CALCIUM 40 MG: 40 TABLET, FILM COATED ORAL at 21:08

## 2025-04-21 RX ADMIN — SERTRALINE HYDROCHLORIDE 150 MG: 50 TABLET ORAL at 08:09

## 2025-04-21 RX ADMIN — GABAPENTIN 200 MG: 100 CAPSULE ORAL at 15:44

## 2025-04-21 RX ADMIN — INSULIN LISPRO 2 UNITS: 100 INJECTION, SOLUTION INTRAVENOUS; SUBCUTANEOUS at 06:45

## 2025-04-21 RX ADMIN — AMOXICILLIN AND CLAVULANATE POTASSIUM 1 TABLET: 500; 125 TABLET, FILM COATED ORAL at 21:20

## 2025-04-21 RX ADMIN — ASPIRIN 81 MG: 81 TABLET, CHEWABLE ORAL at 08:09

## 2025-04-21 RX ADMIN — HEPARIN SODIUM 5000 UNITS: 5000 INJECTION INTRAVENOUS; SUBCUTANEOUS at 21:21

## 2025-04-21 RX ADMIN — HYDROXYZINE PAMOATE 25 MG: 25 CAPSULE ORAL at 08:35

## 2025-04-21 RX ADMIN — AMOXICILLIN AND CLAVULANATE POTASSIUM 1 TABLET: 500; 125 TABLET, FILM COATED ORAL at 08:08

## 2025-04-21 RX ADMIN — GABAPENTIN 200 MG: 100 CAPSULE ORAL at 08:08

## 2025-04-21 RX ADMIN — CLOPIDOGREL BISULFATE 75 MG: 75 TABLET, FILM COATED ORAL at 08:09

## 2025-04-21 RX ADMIN — OXYCODONE 5 MG: 5 TABLET ORAL at 21:09

## 2025-04-21 RX ADMIN — ISOSORBIDE MONONITRATE 30 MG: 30 TABLET, EXTENDED RELEASE ORAL at 08:08

## 2025-04-21 RX ADMIN — OXYCODONE 5 MG: 5 TABLET ORAL at 15:54

## 2025-04-21 ASSESSMENT — PAIN SCALES - GENERAL
PAINLEVEL_OUTOF10: 8
PAINLEVEL_OUTOF10: 8
PAINLEVEL_OUTOF10: 6
PAINLEVEL_OUTOF10: 6
PAINLEVEL_OUTOF10: 9
PAINLEVEL_OUTOF10: 10
PAINLEVEL_OUTOF10: 3

## 2025-04-21 ASSESSMENT — PAIN DESCRIPTION - DESCRIPTORS
DESCRIPTORS: SHARP
DESCRIPTORS: SHARP

## 2025-04-21 ASSESSMENT — PAIN DESCRIPTION - ORIENTATION
ORIENTATION: RIGHT
ORIENTATION: RIGHT

## 2025-04-21 ASSESSMENT — PAIN DESCRIPTION - LOCATION
LOCATION: HEAD
LOCATION: FACE
LOCATION: HEAD
LOCATION: FACE

## 2025-04-21 ASSESSMENT — PAIN - FUNCTIONAL ASSESSMENT
PAIN_FUNCTIONAL_ASSESSMENT: FACE, LEGS, ACTIVITY, CRY, AND CONSOLABILITY (FLACC)
PAIN_FUNCTIONAL_ASSESSMENT: 0-10
PAIN_FUNCTIONAL_ASSESSMENT: FACE, LEGS, ACTIVITY, CRY, AND CONSOLABILITY (FLACC)

## 2025-04-21 NOTE — ED NOTES
Patient resting at this time. No signs or symptoms of distress. Respirations even and unlabored. Sitter at bedside. Safety precautions in place.      Jeet Finn RN  04/21/25 1611

## 2025-04-21 NOTE — ED NOTES
Patient resting at this time. No signs or symptoms of distress. Respirations even and unlabored. Sitter at bedside. Safety precautions in place.      Jeet Finn RN  04/21/25 9075

## 2025-04-21 NOTE — ED NOTES
Patient resting at this time. No signs or symptoms of distress. Respirations even and unlabored. Sitter at bedside. Safety precautions in place.      Jeet Finn RN  04/21/25 4935

## 2025-04-21 NOTE — ED NOTES
Patient resting at this time. No signs or symptoms of distress. Respirations even and unlabored. Sitter at bedside. Safety precautions in place.      Jeet Finn RN  04/21/25 5862

## 2025-04-21 NOTE — PROGRESS NOTES
Hospitalist Consult Progress Note   Admit Date:  2025 10:26 PM   Name:  La Nena Campbell   Age:  40 y.o.  Sex:  female  :  1985   MRN:  [id number]   Room:  ER/    Presenting/Chief Complaint: No chief complaint on file.     Reason(s) for Admission: No admission diagnoses are documented for this encounter.     Hospitalists consulted for: Medical management   Hospital Course:   La Nena Campbell is a 40 y.o. female with history of CAD, HTN, Depression, T2DM who is currently in ED Behavioral Hold for suicidal ideations.  Tele Psych had evaluated patient and recommended involuntary commitment.      We were consulted for medical management.     25:   Patient lying in bed.  AAO x 3.  No acute complaints at this time.  Reports that her pain on her nose due to her facial cellulitis is improved and controlled.    Assessment & Plan:   Suicidal Ideation  MDD  - currently in ED Behavioral Hold : Psych recommending Involuntary commitment.   - meds as per Psych. Increased zoloft to 150mg daily, PRN hydroxyzine, zyprexa.   - suicide precaution     CAD // HTN   - continue with DAPT, lipitor  - continue with imdur and toprolol xl     T2DM with neuropathy  FS controlled at this time.   - on home gabapentin  - continue with lantus 20U nightly  - On sliding scale  - change diet to diabetic diet    CKD4: Cr stable and at baseline.      Facial Infection  Was hospitalized - for facial pain after getting a bug bite. Treated for sepsis due to facial infection and dc with doxy+augmentin for 10days  - restarted doxy PO and augmentin PO BID for 10 days  - PRN oxy for pain     Anticipated Discharge Arrangements:   Inpatient Psych Facility     Diet:  ADULT DIET; Regular; 5 carb choices (75 gm/meal); Safety Tray; Safety Tray (Disposables)  VTE prophylaxis: Lovenox  Code status: No Order      Non-peripheral Lines and Tubes (if present):          Telemetry (if present):           Hospital Problems:  Active Problems:

## 2025-04-21 NOTE — ED NOTES
Patient resting at this time. No signs or symptoms of distress. Respirations even and unlabored. Sitter at bedside. Safety precautions in place.      Jeet Finn RN  04/21/25 0110

## 2025-04-21 NOTE — ED NOTES
Patient resting at this time. No signs or symptoms of distress. Respirations even and unlabored. Sitter at bedside. Safety precautions in place.      Jeet Finn RN  04/21/25 8957

## 2025-04-21 NOTE — ED NOTES
Constant Observer Yes - Name: Sitter   Constant Observer Oriented yes   High risk patients are in line of sight at all times Yes   Excess equipment/medical supplies not necessary for the care of the patient removed Yes   All sharp or dangerous objects are removed from room: including but not limited to belts, pens & pencils, needles, medications, cosmetics, lighters, matches, nail files, watches, necklaces, glass objects, razors, razor blades, knives, aerosol sprays, drawstring pants, shoes, cords (telephone, call bells, etc.) cleaning wipes or other cleaning items, aluminum cans, not permanently attached wall décor Yes   Telephone/cell phone removed as well as TV remote (batteries can be swallowed) Yes   Patient belongings removed and labeled at nurses station Yes   Excess linen is removed from room Yes   All plastic bags are removed from the room and replaced with paper trash bags Yes   Patient is in paper scrubs or appropriate gown and using hospital socks with rubber soles Yes   No metal, hard eating utensils or hard plates are on meal tray Yes   Remove all cleaning agents used by Environmental Services Yes   If Crucifix is hanging on a nail, remove Crucifix as well as the nail No - N/A       *If any question above is answered \"No,\" documentation is required.       Jeet Finn RN  04/21/25 6762

## 2025-04-21 NOTE — ED NOTES
Report given to YOLANDA Marcano. Transferring patient care at this time.      Link Wharton RN  04/21/25 0713

## 2025-04-21 NOTE — ED NOTES
Constant Observer Yes - Name: bart Monet Observer Oriented yes   High risk patients are in line of sight at all times Yes   Excess equipment/medical supplies not necessary for the care of the patient removed Yes   All sharp or dangerous objects are removed from room: including but not limited to belts, pens & pencils, needles, medications, cosmetics, lighters, matches, nail files, watches, necklaces, glass objects, razors, razor blades, knives, aerosol sprays, drawstring pants, shoes, cords (telephone, call bells, etc.) cleaning wipes or other cleaning items, aluminum cans, not permanently attached wall décor Yes   Telephone/cell phone removed as well as TV remote (batteries can be swallowed) Yes   Patient belongings removed and labeled at nurses station Yes   Excess linen is removed from room Yes   All plastic bags are removed from the room and replaced with paper trash bags Yes   Patient is in paper scrubs or appropriate gown and using hospital socks with rubber soles Yes   No metal, hard eating utensils or hard plates are on meal tray Yes   Remove all cleaning agents used by Environmental Services Yes   If Crucifix is hanging on a nail, remove Crucifix as well as the nail Yes       *If any question above is answered \"No,\" documentation is required.        Vanesa Liu RN  04/21/25 1938

## 2025-04-21 NOTE — ED NOTES
Patient resting at this time. No signs or symptoms of distress. Respirations even and unlabored. Sitter at bedside. Safety precautions in place.      Jeet Finn RN  04/21/25 0457

## 2025-04-21 NOTE — ED NOTES
Patient resting at this time. No signs or symptoms of distress. Respirations even and unlabored. Sitter at bedside. Safety precautions in place.      Jeet Finn RN  04/21/25 3316

## 2025-04-21 NOTE — ED NOTES
Patient resting at this time. No signs or symptoms of distress. Respirations even and unlabored. Sitter at bedside. Safety precautions in place.      Jeet Finn RN  04/21/25 7142

## 2025-04-21 NOTE — ED NOTES
Constant Observer Yes - Name: Norah TERRIE   Constant Observer Oriented yes   High risk patients are in line of sight at all times Yes   Excess equipment/medical supplies not necessary for the care of the patient removed Yes   All sharp or dangerous objects are removed from room: including but not limited to belts, pens & pencils, needles, medications, cosmetics, lighters, matches, nail files, watches, necklaces, glass objects, razors, razor blades, knives, aerosol sprays, drawstring pants, shoes, cords (telephone, call bells, etc.) cleaning wipes or other cleaning items, aluminum cans, not permanently attached wall décor Yes   Telephone/cell phone removed as well as TV remote (batteries can be swallowed) Yes   Patient belongings removed and labeled at nurses station Yes   Excess linen is removed from room Yes   All plastic bags are removed from the room and replaced with paper trash bags Yes   Patient is in paper scrubs or appropriate gown and using hospital socks with rubber soles Yes   No metal, hard eating utensils or hard plates are on meal tray Yes   Remove all cleaning agents used by Environmental Services Yes   If Crucifix is hanging on a nail, remove Crucifix as well as the nail Yes       *If any question above is answered \"No,\" documentation is required.       Link Wharton RN  04/20/25 2010

## 2025-04-21 NOTE — ED NOTES
Patient resting at this time. No signs or symptoms of distress. Respirations even and unlabored. Sitter at bedside. Safety precautions in place.      Jeet Finn RN  04/21/25 1128

## 2025-04-21 NOTE — ED NOTES
Report received from YOLANDA Purdy. Assuming patient care at this time.      Link Wharton RN  04/20/25 2010

## 2025-04-21 NOTE — ED NOTES
Patient resting at this time. No signs or symptoms of distress. Respirations even and unlabored. Sitter at bedside. Safety precautions in place.      Jeet Finn RN  04/21/25 3875

## 2025-04-22 VITALS
BODY MASS INDEX: 31.89 KG/M2 | TEMPERATURE: 98.2 F | WEIGHT: 180 LBS | HEART RATE: 70 BPM | OXYGEN SATURATION: 100 % | HEIGHT: 63 IN | RESPIRATION RATE: 18 BRPM | SYSTOLIC BLOOD PRESSURE: 114 MMHG | DIASTOLIC BLOOD PRESSURE: 66 MMHG

## 2025-04-22 LAB
GLUCOSE BLD STRIP.AUTO-MCNC: 118 MG/DL (ref 65–100)
HCT VFR BLD AUTO: 39.6 % (ref 35.8–46.3)
HGB BLD-MCNC: 12.8 G/DL (ref 11.7–15.4)
SERVICE CMNT-IMP: ABNORMAL

## 2025-04-22 PROCEDURE — 85014 HEMATOCRIT: CPT

## 2025-04-22 PROCEDURE — 6370000000 HC RX 637 (ALT 250 FOR IP): Performed by: INTERNAL MEDICINE

## 2025-04-22 PROCEDURE — 82962 GLUCOSE BLOOD TEST: CPT

## 2025-04-22 PROCEDURE — 6360000002 HC RX W HCPCS: Performed by: INTERNAL MEDICINE

## 2025-04-22 PROCEDURE — 6370000000 HC RX 637 (ALT 250 FOR IP): Performed by: EMERGENCY MEDICINE

## 2025-04-22 PROCEDURE — 36415 COLL VENOUS BLD VENIPUNCTURE: CPT

## 2025-04-22 PROCEDURE — 85018 HEMOGLOBIN: CPT

## 2025-04-22 RX ADMIN — ASPIRIN 81 MG: 81 TABLET, CHEWABLE ORAL at 08:29

## 2025-04-22 RX ADMIN — AMOXICILLIN AND CLAVULANATE POTASSIUM 1 TABLET: 500; 125 TABLET, FILM COATED ORAL at 08:27

## 2025-04-22 RX ADMIN — METOPROLOL SUCCINATE 25 MG: 25 TABLET, EXTENDED RELEASE ORAL at 08:30

## 2025-04-22 RX ADMIN — OXYCODONE 5 MG: 5 TABLET ORAL at 08:28

## 2025-04-22 RX ADMIN — HEPARIN SODIUM 5000 UNITS: 5000 INJECTION INTRAVENOUS; SUBCUTANEOUS at 08:30

## 2025-04-22 RX ADMIN — REPAGLINIDE 1 MG: 1 TABLET ORAL at 08:28

## 2025-04-22 RX ADMIN — CLOPIDOGREL BISULFATE 75 MG: 75 TABLET, FILM COATED ORAL at 08:29

## 2025-04-22 RX ADMIN — DOXYCYCLINE HYCLATE 100 MG: 100 CAPSULE ORAL at 08:28

## 2025-04-22 RX ADMIN — GABAPENTIN 200 MG: 100 CAPSULE ORAL at 08:28

## 2025-04-22 RX ADMIN — ISOSORBIDE MONONITRATE 30 MG: 30 TABLET, EXTENDED RELEASE ORAL at 08:29

## 2025-04-22 RX ADMIN — SERTRALINE HYDROCHLORIDE 150 MG: 50 TABLET ORAL at 08:28

## 2025-04-22 ASSESSMENT — PAIN DESCRIPTION - LOCATION: LOCATION: ABDOMEN

## 2025-04-22 ASSESSMENT — PAIN SCALES - GENERAL: PAINLEVEL_OUTOF10: 9

## 2025-04-22 NOTE — ED NOTES
Constant Observer Yes - Name: Amparo Monet Observer Oriented yes   High risk patients are in line of sight at all times Yes   Excess equipment/medical supplies not necessary for the care of the patient removed Yes   All sharp or dangerous objects are removed from room: including but not limited to belts, pens & pencils, needles, medications, cosmetics, lighters, matches, nail files, watches, necklaces, glass objects, razors, razor blades, knives, aerosol sprays, drawstring pants, shoes, cords (telephone, call bells, etc.) cleaning wipes or other cleaning items, aluminum cans, not permanently attached wall décor Yes   Telephone/cell phone removed as well as TV remote (batteries can be swallowed) Yes   Patient belongings removed and labeled at nurses station Yes   Excess linen is removed from room Yes   All plastic bags are removed from the room and replaced with paper trash bags Yes   Patient is in paper scrubs or appropriate gown and using hospital socks with rubber soles Yes   No metal, hard eating utensils or hard plates are on meal tray Yes   Remove all cleaning agents used by Environmental Services Yes   If Crucifix is hanging on a nail, remove Crucifix as well as the nail Yes        Kika Babcock RN  04/22/25 0737

## 2025-04-22 NOTE — CONSULTS
Arranged consult with [facility] Tele Psychiatry via web site. Consult ID: [id number]  
release tablet, Take 1 tablet by mouth daily, Disp: , Rfl:     pantoprazole (PROTONIX) 40 MG tablet, Take 1 tablet by mouth daily, Disp: , Rfl:     atorvastatin (LIPITOR) 40 MG tablet, Take 1 tablet by mouth daily, Disp: , Rfl:     sertraline (ZOLOFT) 100 MG tablet, Take 1 tablet by mouth daily, Disp: , Rfl:     Cholecalciferol 50 MCG (2000 UT) TABS, Take 1 tablet by mouth, Disp: , Rfl:     FLUoxetine (PROZAC) 40 MG capsule, Take 1 capsule by mouth daily, Disp: , Rfl:     gabapentin (NEURONTIN) 800 MG tablet, Take 1 tablet by mouth 3 times daily., Disp: , Rfl:     insulin aspart (NOVOLOG) 100 UNIT/ML injection vial, Inject into the skin, Disp: , Rfl:     insulin glargine (LANTUS) 100 UNIT/ML injection vial, Inject 20 Units into the skin nightly, Disp: , Rfl:     lisinopril (PRINIVIL;ZESTRIL) 5 MG tablet, Take 1 tablet by mouth daily, Disp: , Rfl:     promethazine (PHENERGAN) 25 MG tablet, Take 1 tablet by mouth every 6 hours as needed, Disp: , Rfl:     Allergies:  Allergies   Allergen Reactions    Tramadol Rash       Past Psychiatric History (over the past 6 months, unless otherwise specified):  Previous diagnoses/symptoms: ***{MENTAL HEALTH HISTORY:39594}{Previous Diagnoses/symptoms:66799:::1}   Self-injurious behavior/risky thoughts or behaviors (past suicidal ideation/attempt): ***{Blank single:19197::\"Denies\",\"***\"}{Suicide attempt:36487}{Previous suicide attempts/self-harm:83698:::1}   Violence/Risk to others: (past homicidal ideation/attempt): ***   Current psychiatric provider: {Blank single:19197::\"Denies\",\"Endorses ***\"}  Previous psychiatric medication trials: ***{Blank single:19197::\"Denies\",\"***\"}{BH med list:68478} {Previous psychiatric medication trials:80953:::1}  {Current psychiatric medications:82686:::1}  Previous psychiatric hospitalizations: ***{Inpatient psychiatric hospitalizations:52340}   Previous therapy: {Current therapist:96515:::1}   Previous ECT: ***    Past Medical History:  History of 
Headaches or Migraine    nitroGLYCERIN (NITROSTAT) 0.4 MG SL tablet Place 1 tablet under the tongue every 5 minutes as needed for Chest pain up to max of 3 total doses. If no relief after 1 dose, call 911.    metoprolol succinate (TOPROL XL) 25 MG extended release tablet Take 1 tablet by mouth daily    pantoprazole (PROTONIX) 40 MG tablet Take 1 tablet by mouth daily    atorvastatin (LIPITOR) 40 MG tablet Take 1 tablet by mouth daily    sertraline (ZOLOFT) 100 MG tablet Take 1 tablet by mouth daily    Cholecalciferol 50 MCG (2000 UT) TABS Take 1 tablet by mouth    FLUoxetine (PROZAC) 40 MG capsule Take 1 capsule by mouth daily    gabapentin (NEURONTIN) 800 MG tablet Take 1 tablet by mouth 3 times daily.    insulin aspart (NOVOLOG) 100 UNIT/ML injection vial Inject into the skin    insulin glargine (LANTUS) 100 UNIT/ML injection vial Inject 20 Units into the skin nightly    lisinopril (PRINIVIL;ZESTRIL) 5 MG tablet Take 1 tablet by mouth daily    promethazine (PHENERGAN) 25 MG tablet Take 1 tablet by mouth every 6 hours as needed       Signed:  Roberto Davila MD    Part of this note may have been written by using a voice dictation software.  The note has been proof read but may still contain some grammatical/other typographical errors.

## 2025-04-22 NOTE — PROGRESS NOTES
Hospitalist Consult Progress Note   Admit Date:  2025 10:26 PM   Name:  La Nena Campbell   Age:  40 y.o.  Sex:  female  :  1985   MRN:  [id number]   Room:  ER/    Presenting/Chief Complaint: No chief complaint on file.     Reason(s) for Admission: No admission diagnoses are documented for this encounter.     Hospitalists consulted for: Medical management   Hospital Course:   La Nena Campbell is a 40 y.o. female with history of CAD, HTN, Depression, T2DM who is currently in ED Behavioral Hold for suicidal ideations.  Tele Psych had evaluated patient and recommended involuntary commitment.      We were consulted for medical management.     25:   Patient lying in bed.  AAO x 3.  Reports pain on her nose due to her cellulitis has improved.  Complains of lower abdominal cramping as she is currently on her menstrual cycles       Assessment & Plan:   Suicidal Ideation  MDD  - currently in ED Behavioral Hold : Psych recommending Involuntary commitment whioswetha will  soon. Plan for re-eval with psych as her SI could be complicated or brought on by her meth use.  - meds as per Psych: Zoloft to 150mg daily, PRN hydroxyzine, zyprexa.   - suicide precaution     CAD // HTN   - continue with DAPT, lipitor  - continue with imdur and toprolol xl     T2DM with neuropathy  FS controlled at this time.   - on home gabapentin  - continue with lantus 20U nightly  - On sliding scale  - change diet to diabetic diet    CKD4: Cr stable and at baseline.      Facial Infection  Was hospitalized - for facial pain after getting a bug bite. Treated for sepsis due to facial infection and dc with doxy+augmentin for 10days  - restarted doxy PO and augmentin PO BID for 10 days  - PRN oxy for pain     Anticipated Discharge Arrangements:   Inpatient Psych Facility     Diet:  ADULT DIET; Regular; 5 carb choices (75 gm/meal); Safety Tray; Safety Tray (Disposables)  VTE prophylaxis: Lovenox  Code status: No

## 2025-04-22 NOTE — CARE COORDINATION
Patient continues on IVC papers which  tomorrow.  Referrals made and she has not been accepted to inpatient treatment.  Patient reports she has a mother in the area but cannot live with her and is homeless.      Patient reports symptoms improving and a re-evaluation by psych RN NP is ordered by Dr. Queen.  Patient is also a meth user complicating her social situation.   CM has discussed shelter and boarding home options with patient which was already aware.

## 2025-04-22 NOTE — ED PROVIDER NOTES
[facility] ED Psychiatric RECHECK NOTE for 2025  Arrival Date/Time: 2025 10:26 PM      La Nena Campbell  MRN: [id number]    YOB: 1985   40 y.o. female    [facility] EMERGENCY DEPARTMENT ER10/10  Reeval on 2025 @ 8:42 AM       She is on involuntary commitment papers.  They  on 2025    She has completed a behavioral health evaluation.     Home medications and recommended psychiatric medications have been ordered.      La Nena Campbell is a 40 y.o. female originally presented for depression, suicidal thoughts, and substance abuse.      Interval history: Patient's complex medical problems including diabetes, CHF and renal insufficiency are being treated by the hospitalist through consultation.  Patient on papers that are due to  tomorrow.  Patient denies SI at present, but not sure how she will feel when she is back [place].  Patient states sleep has helped a lot with her mental attitude.  Patient evaluated by psychiatry, recommended reversal of commitment papers as patient's SI seems to be directly related to her housing issues, and conditional that if we find her housing she would no longer be suicidal.    Vitals:    25 0850 25 1923 25 0745 25 0830   BP: 114/63 123/64 130/70 130/70   Pulse: 60 72 70 70   Resp: 20 16 16    Temp:  98.6 °F (37 °C) 98.1 °F (36.7 °C)    TempSrc:       SpO2: 97% 100% 100%    Weight:       Height:          Current Facility-Administered Medications   Medication Dose Route Frequency    sertraline (ZOLOFT) tablet 150 mg  150 mg Oral Daily    insulin lispro (HUMALOG,ADMELOG) injection vial 0-8 Units  0-8 Units SubCUTAneous 4x Daily AC & HS    doxycycline hyclate (VIBRAMYCIN) capsule 100 mg  100 mg Oral 2 times per day    amoxicillin-clavulanate (AUGMENTIN) 500-125 MG per tablet 1 tablet  1 tablet Oral 2 times per day    oxyCODONE (ROXICODONE) immediate release tablet 5 mg  5 mg Oral Q4H PRN